# Patient Record
Sex: MALE | Race: OTHER | HISPANIC OR LATINO
[De-identification: names, ages, dates, MRNs, and addresses within clinical notes are randomized per-mention and may not be internally consistent; named-entity substitution may affect disease eponyms.]

---

## 2018-07-02 PROBLEM — Z00.00 ENCOUNTER FOR PREVENTIVE HEALTH EXAMINATION: Status: ACTIVE | Noted: 2018-07-02

## 2018-07-03 ENCOUNTER — APPOINTMENT (OUTPATIENT)
Dept: DERMATOLOGY | Facility: CLINIC | Age: 53
End: 2018-07-03
Payer: COMMERCIAL

## 2018-07-03 VITALS — BODY MASS INDEX: 30.66 KG/M2 | WEIGHT: 184 LBS | HEIGHT: 65 IN

## 2018-07-03 DIAGNOSIS — B35.1 TINEA UNGUIUM: ICD-10-CM

## 2018-07-03 DIAGNOSIS — Z86.39 PERSONAL HISTORY OF OTHER ENDOCRINE, NUTRITIONAL AND METABOLIC DISEASE: ICD-10-CM

## 2018-07-03 DIAGNOSIS — B35.4 TINEA CORPORIS: ICD-10-CM

## 2018-07-03 PROCEDURE — 99214 OFFICE O/P EST MOD 30 MIN: CPT

## 2018-07-03 RX ORDER — LOSARTAN POTASSIUM 50 MG/1
50 TABLET, FILM COATED ORAL
Qty: 30 | Refills: 0 | Status: ACTIVE | COMMUNITY
Start: 2018-05-09

## 2018-07-03 RX ORDER — ROSUVASTATIN CALCIUM 10 MG/1
10 TABLET, FILM COATED ORAL
Qty: 30 | Refills: 0 | Status: ACTIVE | COMMUNITY
Start: 2017-12-13

## 2018-07-03 RX ORDER — LEVOTHYROXINE SODIUM 0.12 MG/1
125 TABLET ORAL
Qty: 30 | Refills: 0 | Status: ACTIVE | COMMUNITY
Start: 2018-04-17

## 2018-08-28 ENCOUNTER — MEDICATION RENEWAL (OUTPATIENT)
Age: 53
End: 2018-08-28

## 2018-08-28 RX ORDER — TERBINAFINE HYDROCHLORIDE 250 MG/1
250 TABLET ORAL
Qty: 30 | Refills: 1 | Status: ACTIVE | COMMUNITY
Start: 2018-07-03 | End: 1900-01-01

## 2021-06-09 ENCOUNTER — NON-APPOINTMENT (OUTPATIENT)
Age: 56
End: 2021-06-09

## 2021-06-09 ENCOUNTER — APPOINTMENT (OUTPATIENT)
Dept: ORTHOPEDIC SURGERY | Facility: CLINIC | Age: 56
End: 2021-06-09
Payer: COMMERCIAL

## 2021-06-09 VITALS
DIASTOLIC BLOOD PRESSURE: 85 MMHG | HEIGHT: 65 IN | HEART RATE: 60 BPM | WEIGHT: 163 LBS | SYSTOLIC BLOOD PRESSURE: 142 MMHG | BODY MASS INDEX: 27.16 KG/M2

## 2021-06-09 DIAGNOSIS — M67.441 GANGLION, RIGHT HAND: ICD-10-CM

## 2021-06-09 DIAGNOSIS — M67.442 GANGLION, LEFT HAND: ICD-10-CM

## 2021-06-09 PROCEDURE — 99204 OFFICE O/P NEW MOD 45 MIN: CPT

## 2021-06-09 PROCEDURE — 99072 ADDL SUPL MATRL&STAF TM PHE: CPT

## 2021-06-09 NOTE — PHYSICAL EXAM
[Normal Finger/nose] : finger to nose coordination [Normal] : no peripheral adenopathy appreciated [de-identified] : bilateral hand:\par Skin intact, no erythema, no ecchymosis, no gross deformity. \par Palpable 1x1 cm mass at the base of the middle finger distal to the MCP, mobile, nonpainful. \par ROM of the digits intact without pain, normal cascade. Active flexion and extension. 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 There is also evidence of some chondral cyst formation versus enchondroma in the distal aspect of the third metacarpal and second metacarpal.

## 2021-06-09 NOTE — HISTORY OF PRESENT ILLNESS
[FreeTextEntry1] : 56 year old male presents for evaluation of a mass along the base of bilateral middle fingers. he denies any acute injury or inciting event. He reports he noticed the mass first on the left middle finger around February, but notes developing a mass on the right middle finger subsequently. He reports minimal pain at this time. 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 He was seen and evaluated by another physician, who recommended MRI of the left hand, which he presents for review.

## 2021-06-09 NOTE — ASSESSMENT
[FreeTextEntry1] : 56 year old male presents for evaluation of bilateral hand middle finger mass, MRI and exam findings consistent with ganglion cyst. \par The nature and history of the condition was discussed with the patient in detail. Treatment options in this case stem from observation of the condition, and or surgical excision of the ganglion cyst were discussed, including risks and benefits of both. At this time the patient would like to consider the surgical excision.

## 2022-11-04 ENCOUNTER — APPOINTMENT (OUTPATIENT)
Dept: UROLOGY | Facility: CLINIC | Age: 57
End: 2022-11-04

## 2022-11-04 VITALS
BODY MASS INDEX: 30.9 KG/M2 | HEIGHT: 64 IN | OXYGEN SATURATION: 97 % | SYSTOLIC BLOOD PRESSURE: 142 MMHG | HEART RATE: 65 BPM | WEIGHT: 181 LBS | DIASTOLIC BLOOD PRESSURE: 88 MMHG

## 2022-11-04 DIAGNOSIS — N40.0 BENIGN PROSTATIC HYPERPLASIA WITHOUT LOWER URINARY TRACT SYMPMS: ICD-10-CM

## 2022-11-04 DIAGNOSIS — D17.9 BENIGN LIPOMATOUS NEOPLASM, UNSPECIFIED: ICD-10-CM

## 2022-11-04 PROCEDURE — 99204 OFFICE O/P NEW MOD 45 MIN: CPT

## 2022-11-07 NOTE — PHYSICAL EXAM
[General Appearance - Well Developed] : well developed [General Appearance - Well Nourished] : well nourished [Normal Appearance] : normal appearance [Well Groomed] : well groomed [General Appearance - In No Acute Distress] : no acute distress [Edema] : no peripheral edema [Respiration, Rhythm And Depth] : normal respiratory rhythm and effort [Exaggerated Use Of Accessory Muscles For Inspiration] : no accessory muscle use [Abdomen Soft] : soft [Abdomen Tenderness] : non-tender [Costovertebral Angle Tenderness] : no ~M costovertebral angle tenderness [Urethral Meatus] : meatus normal [Urinary Bladder Findings] : the bladder was normal on palpation [Scrotum] : the scrotum was normal [Testes Mass (___cm)] : there were no testicular masses [No Prostate Nodules] : no prostate nodules [Normal Station and Gait] : the gait and station were normal for the patient's age [] : no rash [No Focal Deficits] : no focal deficits [Oriented To Time, Place, And Person] : oriented to person, place, and time [Affect] : the affect was normal [Mood] : the mood was normal [Not Anxious] : not anxious [No Palpable Adenopathy] : no palpable adenopathy [FreeTextEntry1] : Normal male genitalia. The prostate gland is small to size and palpably benign.

## 2022-11-07 NOTE — HISTORY OF PRESENT ILLNESS
[FreeTextEntry1] : 57M presents today with a CC of back pain. Pt had strained his back at work. He had a renal US as part of his workup. It showed a angiomyolipoma and he is here regarding that. He has no history of genitourinary problems. He states that his yearly labs, including his urine and PSA re done by his PMD.

## 2022-11-07 NOTE — END OF VISIT
[FreeTextEntry3] : A renal US will be checked in 1 year. There is little cause for alarm regarding this particular lesion.

## 2023-01-03 ENCOUNTER — APPOINTMENT (OUTPATIENT)
Dept: ORTHOPEDIC SURGERY | Facility: CLINIC | Age: 58
End: 2023-01-03
Payer: COMMERCIAL

## 2023-01-03 ENCOUNTER — NON-APPOINTMENT (OUTPATIENT)
Age: 58
End: 2023-01-03

## 2023-01-03 VITALS
WEIGHT: 181 LBS | SYSTOLIC BLOOD PRESSURE: 161 MMHG | HEIGHT: 64 IN | DIASTOLIC BLOOD PRESSURE: 103 MMHG | BODY MASS INDEX: 30.9 KG/M2 | HEART RATE: 62 BPM

## 2023-01-03 PROCEDURE — 99214 OFFICE O/P EST MOD 30 MIN: CPT

## 2023-01-03 PROCEDURE — 73564 X-RAY EXAM KNEE 4 OR MORE: CPT | Mod: LT

## 2023-01-03 RX ORDER — MELOXICAM 15 MG/1
15 TABLET ORAL DAILY
Qty: 30 | Refills: 0 | Status: ACTIVE | COMMUNITY
Start: 2023-01-03 | End: 1900-01-01

## 2023-01-03 NOTE — DISCUSSION/SUMMARY
[de-identified] : Left knee internal derangement, left lower extremity swelling\par \par Extensive discussion of the natural history of this issue was had with the patient.  We discussed the treatment options focusing on conservative therapy which includes anti-inflammatories, physical therapy/home exercise, & activity modification.  Due to the swelling of his left lower extremity would like a Doppler to rule out DVT.  I do not feel this has a high likelihood however.  An MRI was also ordered to evaluate his left knee as he is unable to work due to the pain.  We discussed possible arthroscopic surgery if it shows meniscus tear.  Also discussed steroid injections which the patient would like to hold off on.  I will call the patient if the ultrasound shows a DVT.  He will follow-up after the MRI is completed however.  We discussed that he should get the ultrasound done soon as possible.  A prescription for meloxicam was also sent to his pharmacy for the pain recommend a knee sleeve for now.

## 2023-01-03 NOTE — HISTORY OF PRESENT ILLNESS
[de-identified] : Patient here for left knee pain.  States began 2 days ago when he was playing soccer.  He was running and cutting knee felt pain.  Does have a popping sensation.  Scribes swelling around the knee as well as down the lower extremity.  Scribes the pain is mostly on the posterior lateral aspect especially when flexing his knee.  Took Advil which helped a little bit.  Is having pain with stairs.  Denies any prior issues with his knee.  Does need states his left lower extremity looks "strange and bumpy ".  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

## 2023-01-03 NOTE — PHYSICAL EXAM
[de-identified] : General Appearance: normal without acute distress\par Mental: Alert and oriented x 3\par Psych/affect: appropriate, cooperative\par Gait: Antalgic gait\par \par Left knee\par Inspection: Mild effusion, no erythema.\par Wounds: None.\par Alignment: neutral.\par Palpation: Tender to palpation lateral joint line\par ROM active (in degrees): 0-130, pain with deep flexion\par Ligamentous laxity: stable to varus stress test, stable to valgus stress test. Negative Lachman's test\par Meniscal Test: Negative McMurrays, positive Anjali.\par Muscle Test: good quad strength. [de-identified] : Left knee xrays, standing AP/Lateral and Merchant films, and 45 degree PA standing view taken today in the office are reviewed and demonstrate preserved joint space, no abnormalities

## 2023-01-04 ENCOUNTER — OUTPATIENT (OUTPATIENT)
Dept: OUTPATIENT SERVICES | Facility: HOSPITAL | Age: 58
LOS: 1 days | End: 2023-01-04
Payer: COMMERCIAL

## 2023-01-04 ENCOUNTER — APPOINTMENT (OUTPATIENT)
Dept: ULTRASOUND IMAGING | Facility: CLINIC | Age: 58
End: 2023-01-04
Payer: COMMERCIAL

## 2023-01-04 DIAGNOSIS — M23.92 UNSPECIFIED INTERNAL DERANGEMENT OF LEFT KNEE: ICD-10-CM

## 2023-01-04 PROCEDURE — 93971 EXTREMITY STUDY: CPT

## 2023-01-04 PROCEDURE — 93971 EXTREMITY STUDY: CPT | Mod: 26,LT

## 2023-01-13 ENCOUNTER — APPOINTMENT (OUTPATIENT)
Dept: MRI IMAGING | Facility: CLINIC | Age: 58
End: 2023-01-13

## 2023-01-31 ENCOUNTER — APPOINTMENT (OUTPATIENT)
Dept: MRI IMAGING | Facility: CLINIC | Age: 58
End: 2023-01-31
Payer: COMMERCIAL

## 2023-01-31 ENCOUNTER — OUTPATIENT (OUTPATIENT)
Dept: OUTPATIENT SERVICES | Facility: HOSPITAL | Age: 58
LOS: 1 days | End: 2023-01-31
Payer: COMMERCIAL

## 2023-01-31 DIAGNOSIS — M23.92 UNSPECIFIED INTERNAL DERANGEMENT OF LEFT KNEE: ICD-10-CM

## 2023-01-31 PROCEDURE — 73721 MRI JNT OF LWR EXTRE W/O DYE: CPT

## 2023-01-31 PROCEDURE — 73721 MRI JNT OF LWR EXTRE W/O DYE: CPT | Mod: 26,LT

## 2023-02-07 ENCOUNTER — APPOINTMENT (OUTPATIENT)
Dept: ORTHOPEDIC SURGERY | Facility: CLINIC | Age: 58
End: 2023-02-07
Payer: COMMERCIAL

## 2023-02-07 DIAGNOSIS — S83.207A UNSPECIFIED TEAR OF UNSPECIFIED MENISCUS, CURRENT INJURY, LEFT KNEE, INITIAL ENCOUNTER: ICD-10-CM

## 2023-02-07 PROCEDURE — 99215 OFFICE O/P EST HI 40 MIN: CPT

## 2023-02-07 NOTE — PHYSICAL EXAM
[de-identified] : Left knee\par Inspection: no erythema.\par Wounds: None.\par Alignment: neutral.\par Palpation: Tender to palpation medial joint line\par ROM active (in degrees): 0-130, pain with deep flexion\par Ligamentous laxity: stable to varus stress test, stable to valgus stress test. Negative Lachman's test\par Meniscal Test: Negative McMurrays, mildly positive Anjali.\par Muscle Test: good quad strength. [de-identified] : MRI left knee reviewed interpreted–lateral and medial meniscus tears\par \par X-rays on 1/3/2023: Left knee xrays, standing AP/Lateral and Merchant films, and 45 degree PA standing view taken today in the office are reviewed and demonstrate preserved joint space, no abnormalities

## 2023-02-07 NOTE — HISTORY OF PRESENT ILLNESS
[de-identified] : Patient here for follow-up for left knee pain.  States the knee is still bothering him although it does feel like it is improved a little bit.  Patient states he still has meloxicam and he is taking that.\par \par 1/3/2023: Patient here for left knee pain.  States began 2 days ago when he was playing soccer.  He was running and cutting knee felt pain.  Does have a popping sensation.  Scribes swelling around the knee as well as down the lower extremity.  Scribes the pain is mostly on the posterior lateral aspect especially when flexing his knee.  Took Advil which helped a little bit.  Is having pain with stairs.  Denies any prior issues with his knee.  Does need states his left lower extremity looks "strange and bumpy ".  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

## 2023-02-07 NOTE — DISCUSSION/SUMMARY
[de-identified] : Left knee medial and lateral meniscus tears\par \par Extensive discussion of the natural history of this issue was had with the patient.  We discussed the treatment options focusing on conservative therapy which includes anti-inflammatories, physical therapy/home exercise, & activity modification.  At this time recommend patient continue meloxicam.  I did offer the patient a steroid injection although he declined at this time.  A prescription for physical therapy was given to him.  We also discussed that if the pain does not improve and that he continues to bother him he may be a candidate for arthroscopy.  Patient will follow-up as needed.

## 2023-08-24 ENCOUNTER — RX RENEWAL (OUTPATIENT)
Age: 58
End: 2023-08-24

## 2024-04-30 ENCOUNTER — APPOINTMENT (OUTPATIENT)
Dept: OTOLARYNGOLOGY | Facility: CLINIC | Age: 59
End: 2024-04-30

## 2024-05-01 ENCOUNTER — APPOINTMENT (OUTPATIENT)
Dept: OTOLARYNGOLOGY | Facility: CLINIC | Age: 59
End: 2024-05-01